# Patient Record
Sex: FEMALE | Race: WHITE | NOT HISPANIC OR LATINO | Employment: FULL TIME | URBAN - METROPOLITAN AREA
[De-identification: names, ages, dates, MRNs, and addresses within clinical notes are randomized per-mention and may not be internally consistent; named-entity substitution may affect disease eponyms.]

---

## 2020-09-10 DIAGNOSIS — E10.9 DIABETES MELLITUS TYPE I: Primary | ICD-10-CM

## 2020-09-10 DIAGNOSIS — E78.00 PURE HYPERCHOLESTEROLEMIA: ICD-10-CM

## 2020-09-10 DIAGNOSIS — Z87.891 PERSONAL HISTORY OF TOBACCO USE, PRESENTING HAZARDS TO HEALTH: ICD-10-CM

## 2020-09-11 DIAGNOSIS — Z78.0 ASYMPTOMATIC MENOPAUSAL STATE: Primary | ICD-10-CM

## 2020-09-11 DIAGNOSIS — Z13.820 SCREENING FOR OSTEOPOROSIS: ICD-10-CM

## 2020-09-17 ENCOUNTER — TELEPHONE (OUTPATIENT)
Dept: CARDIOLOGY | Facility: HOSPITAL | Age: 60
End: 2020-09-17

## 2020-09-17 RX ORDER — INSULIN LISPRO 100 [IU]/ML
INJECTION, SOLUTION INTRAVENOUS; SUBCUTANEOUS
COMMUNITY
Start: 2020-08-20 | End: 2024-04-02

## 2020-09-18 ENCOUNTER — CLINICAL SUPPORT (OUTPATIENT)
Dept: CARDIOLOGY | Facility: HOSPITAL | Age: 60
End: 2020-09-18
Attending: INTERNAL MEDICINE
Payer: COMMERCIAL

## 2020-09-18 VITALS — HEIGHT: 67 IN | BODY MASS INDEX: 24.92 KG/M2 | WEIGHT: 158.81 LBS

## 2020-09-18 DIAGNOSIS — Z87.891 PERSONAL HISTORY OF TOBACCO USE, PRESENTING HAZARDS TO HEALTH: ICD-10-CM

## 2020-09-18 DIAGNOSIS — E10.9 DIABETES MELLITUS TYPE I: ICD-10-CM

## 2020-09-18 DIAGNOSIS — E78.00 PURE HYPERCHOLESTEROLEMIA: ICD-10-CM

## 2020-09-18 LAB
CV STRESS BASE HR: 72 BPM
DIASTOLIC BLOOD PRESSURE: 76 MMHG
OHS CV CPX 1 MINUTE RECOVERY HEART RATE: 109 BPM
OHS CV CPX 85 PERCENT MAX PREDICTED HEART RATE MALE: 131
OHS CV CPX ESTIMATED METS: 10.3
OHS CV CPX MAX PREDICTED HEART RATE: 154
OHS CV CPX PATIENT IS FEMALE: 1
OHS CV CPX PATIENT IS MALE: 0
OHS CV CPX PEAK DIASTOLIC BLOOD PRESSURE: 71 MMHG
OHS CV CPX PEAK HEAR RATE: 170 BPM
OHS CV CPX PEAK RATE PRESSURE PRODUCT: NORMAL
OHS CV CPX PEAK SYSTOLIC BLOOD PRESSURE: 212 MMHG
OHS CV CPX PERCENT MAX PREDICTED HEART RATE ACHIEVED: 110
OHS CV CPX RATE PRESSURE PRODUCT PRESENTING: 7416
STRESS ECHO POST EXERCISE DUR MIN: 8 MINUTES
STRESS ECHO POST EXERCISE DUR SEC: 2 SECONDS
STRESS ST DEPRESSION: 2 MM
SYSTOLIC BLOOD PRESSURE: 103 MMHG

## 2020-09-18 PROCEDURE — 93018 EXERCISE STRESS - EKG (CUPID ONLY): ICD-10-PCS | Mod: ,,, | Performed by: INTERNAL MEDICINE

## 2020-09-18 PROCEDURE — 93017 CV STRESS TEST TRACING ONLY: CPT

## 2020-09-18 PROCEDURE — 93016 EXERCISE STRESS - EKG (CUPID ONLY): ICD-10-PCS | Mod: ,,, | Performed by: INTERNAL MEDICINE

## 2020-09-18 PROCEDURE — 93018 CV STRESS TEST I&R ONLY: CPT | Mod: ,,, | Performed by: INTERNAL MEDICINE

## 2020-09-18 PROCEDURE — 93016 CV STRESS TEST SUPVJ ONLY: CPT | Mod: ,,, | Performed by: INTERNAL MEDICINE

## 2020-09-28 ENCOUNTER — HOSPITAL ENCOUNTER (OUTPATIENT)
Dept: RADIOLOGY | Facility: HOSPITAL | Age: 60
Discharge: HOME | End: 2020-09-28
Attending: INTERNAL MEDICINE
Payer: COMMERCIAL

## 2020-09-28 DIAGNOSIS — Z78.0 ASYMPTOMATIC MENOPAUSAL STATE: ICD-10-CM

## 2020-09-28 DIAGNOSIS — Z13.820 SCREENING FOR OSTEOPOROSIS: ICD-10-CM

## 2020-09-28 PROCEDURE — 77080 DXA BONE DENSITY AXIAL: CPT | Mod: TC,PO

## 2020-11-17 ENCOUNTER — OFFICE VISIT (OUTPATIENT)
Dept: CARDIOLOGY | Facility: CLINIC | Age: 60
End: 2020-11-17
Payer: COMMERCIAL

## 2020-11-17 VITALS
HEART RATE: 78 BPM | WEIGHT: 159 LBS | SYSTOLIC BLOOD PRESSURE: 120 MMHG | RESPIRATION RATE: 16 BRPM | BODY MASS INDEX: 24.96 KG/M2 | OXYGEN SATURATION: 98 % | DIASTOLIC BLOOD PRESSURE: 78 MMHG | TEMPERATURE: 97 F | HEIGHT: 67 IN

## 2020-11-17 DIAGNOSIS — Z87.891 FORMER SMOKER: ICD-10-CM

## 2020-11-17 DIAGNOSIS — I47.10 SVT (SUPRAVENTRICULAR TACHYCARDIA): ICD-10-CM

## 2020-11-17 DIAGNOSIS — Z79.4 TYPE 2 DIABETES MELLITUS WITH HYPERGLYCEMIA, WITH LONG-TERM CURRENT USE OF INSULIN: ICD-10-CM

## 2020-11-17 DIAGNOSIS — E11.65 TYPE 2 DIABETES MELLITUS WITH HYPERGLYCEMIA, WITH LONG-TERM CURRENT USE OF INSULIN: ICD-10-CM

## 2020-11-17 DIAGNOSIS — R06.02 SOB (SHORTNESS OF BREATH): ICD-10-CM

## 2020-11-17 DIAGNOSIS — R94.39 ABNORMAL STRESS ECG: ICD-10-CM

## 2020-11-17 DIAGNOSIS — E78.5 DYSLIPIDEMIA: ICD-10-CM

## 2020-11-17 PROBLEM — E11.9 DM (DIABETES MELLITUS): Status: ACTIVE | Noted: 2020-11-17

## 2020-11-17 PROCEDURE — 93000 EKG 12-LEAD: ICD-10-PCS | Mod: S$GLB,,, | Performed by: INTERNAL MEDICINE

## 2020-11-17 PROCEDURE — 93000 ELECTROCARDIOGRAM COMPLETE: CPT | Mod: S$GLB,,, | Performed by: INTERNAL MEDICINE

## 2020-11-17 PROCEDURE — 3008F BODY MASS INDEX DOCD: CPT | Mod: CPTII,S$GLB,, | Performed by: INTERNAL MEDICINE

## 2020-11-17 PROCEDURE — 3008F PR BODY MASS INDEX (BMI) DOCUMENTED: ICD-10-PCS | Mod: CPTII,S$GLB,, | Performed by: INTERNAL MEDICINE

## 2020-11-17 PROCEDURE — 1126F AMNT PAIN NOTED NONE PRSNT: CPT | Mod: S$GLB,,, | Performed by: INTERNAL MEDICINE

## 2020-11-17 PROCEDURE — 99204 PR OFFICE/OUTPT VISIT, NEW, LEVL IV, 45-59 MIN: ICD-10-PCS | Mod: 25,S$GLB,, | Performed by: INTERNAL MEDICINE

## 2020-11-17 PROCEDURE — 99204 OFFICE O/P NEW MOD 45 MIN: CPT | Mod: 25,S$GLB,, | Performed by: INTERNAL MEDICINE

## 2020-11-17 PROCEDURE — 1126F PR PAIN SEVERITY QUANTIFIED, NO PAIN PRESENT: ICD-10-PCS | Mod: S$GLB,,, | Performed by: INTERNAL MEDICINE

## 2020-11-17 RX ORDER — ATORVASTATIN CALCIUM 10 MG/1
1 TABLET, FILM COATED ORAL
COMMUNITY
Start: 2020-01-06

## 2020-11-17 NOTE — ASSESSMENT & PLAN NOTE
This happened while on the treadmill.   She had no angina, just felt as if it was hard to breathe.  This happens only with exertion  Consider adding Mag Ox 400 mg daily

## 2020-11-17 NOTE — LETTER
November 21, 2020      Sally Shaw MD  [address] [facility]  [place] [address]           [facility] [place]  [address]  [place] [address]  Phone: [phone]  Fax: [phone]          Patient: Clarisa Quiroz   MR Number: [id number]   YOB: 1960   Date of Visit: 11/17/2020       Dear Dr. Sally Shaw:    Thank you for referring Clarisa Quiroz to me for evaluation. Attached you will find relevant portions of my assessment and plan of care.    If you have questions, please do not hesitate to call me. I look forward to following Clarisa Quiroz along with you.    Sincerely,    Santo Ellis MD    Enclosure  CC:  No Recipients    If you would like to receive this communication electronically, please contact [email or web address] or [phone] to request more information on [facility] [employer] access.    For providers and/or their staff who would like to refer a patient to [facility], please contact us through our one-stop-shop provider referral line, [facility], at [phone].    If you feel you have received this communication in error or would no longer like to receive these types of communications, please e-mail [email or web address]

## 2020-11-17 NOTE — PROGRESS NOTES
Subjective:    Patient ID:  Clarisa Quiroz is a 59 y.o. female patient here for evaluation Results (stress test done at [facility]), Shortness of Breath, COPD, and Diabetes      History of Present Illness:     59 year old female patient with a PMH significant for DM, COPD, and Dyslipidemia. She was a smoker for 30 years. Dr. Shaw ordered a stress test and it was nondiagnostic and patient is here to establish care. She denies chest, arm, neck or jaw pain. She does have some SOB but is an ex-smoker and has COPD. She denies cramps when she walks.         Review of patient's allergies indicates:  No Known Allergies    Past Medical History:   Diagnosis Date    COPD (chronic obstructive pulmonary disease)     DM (diabetes mellitus)     Dyslipidemia      History reviewed. No pertinent surgical history.  Social History     Tobacco Use    Smoking status: Former Smoker     Types: Cigarettes     Quit date: 2015     Years since quittin.2    Smokeless tobacco: Never Used   Substance Use Topics    Alcohol use: Not Currently     Comment: socially    Drug use: Never        Review of Systems:    As noted in HPI in addition      REVIEW OF SYSTEMS  CARDIOVASCULAR: No recent chest pain, palpitations, arm, neck, or jaw pain  RESPIRATORY: +SOB on exertion  : No blood in the urine  GI: No Nausea, vomiting, constipation, diarrhea, blood, or reflux.  MUSCULOSKELETAL: No myalgias  NEURO: No lightheadedness or dizziness  EYES: No Double vision, blurry, vision or headache              Objective:        Vitals:    20 1640   BP: 120/78   Pulse: 78   Resp: 16   Temp: 97 °F (36.1 °C)       LIPIDS - LAST 2   No results found for: CHOL, HDL, LDLCALC, TRIG, CHOLHDL    CBC - LAST 2  No results found for: WBC, RBC, HGB, HCT, MCV, MCH, MCHC, RDW, PLT, MPV, GRAN, LYMPH, MONO, BASO, NRBC    CHEMISTRY & LIVER FUNCTION - LAST 2  No results found for: NA, K, CL, CO2, ANIONGAP, BUN, CREATININE, GLU, CALCIUM, PH, MG, ALBUMIN, PROT, ALKPHOS,  ALT, AST, BILITOT     CARDIAC PROFILE - LAST 2  No results found for: BNP, CPK, CPKMB, LDH, TROPONINI     COAGULATION - LAST 2  No results found for: LABPT, INR, APTT    ENDOCRINE & PSA - LAST 2  No results found for: HGBA1C, MICROALBUR, TSH, PROCAL, PSA     ECHOCARDIOGRAM RESULTS  No results found for this or any previous visit.    CURRENT/PREVIOUS VISIT EKG  No results found for this or any previous visit.  No procedure found.   Results for orders placed in visit on 09/18/20   Exercise Stress - EKG    Narrative   The EKG portion of this study is abnormal (with Upsloping ST depression   without angina) but not diagnostic.    The patient reported no chest pain during the stress test.    The blood pressure response to stress was normal.    Arrhythmias during stress: occasional PVCs, SVT.    The exercise capacity was above average.       No procedure found.    PHYSICAL EXAM  CONSTITUTIONAL: Well built, well nourished in no apparent distress  NECK: no carotid bruit, no JVD  LUNGS: CTA  CHEST WALL: no tenderness  HEART: regular rate and rhythm, S1, S2 normal, no murmur, click, rub or gallop   ABDOMEN: soft, non-tender; bowel sounds normal; no masses,  no organomegaly  EXTREMITIES: Extremities normal, no edema, no calf tenderness noted  NEURO: AAO X 3    I HAVE REVIEWED :    The vital signs, nurses notes, and all the pertinent radiology and labs.         Current Outpatient Medications   Medication Instructions    atorvastatin (LIPITOR) 10 MG tablet 1 tablet    HUMALOG U-100 INSULIN 100 unit/mL injection No dose, route, or frequency recorded.          Assessment and Plan:             DM (diabetes mellitus)  Per Dr. Shaw    SVT (supraventricular tachycardia)  This happened while on the treadmill.   She had no angina, just felt as if it was hard to breathe.  This happens only with exertion  Consider adding Mag Ox 400 mg daily    Dyslipidemia  Patient is currently taking atorvastatin every other day secondary to  myalgias  Consider Mag ox 400 mg daily  Add CO Q 10 200 mg daily    Abnormal stress ECG  Non diagnostic  Consider doing Stress Myoview    Former smoker  Currently no longer smoking  Quit in 2015            Follow up in about 2 weeks (around 12/1/2020).

## 2020-12-23 ENCOUNTER — HOSPITAL ENCOUNTER (OUTPATIENT)
Dept: CARDIOLOGY | Facility: CLINIC | Age: 60
Discharge: HOME | End: 2020-12-23
Attending: NURSE PRACTITIONER
Payer: COMMERCIAL

## 2020-12-23 ENCOUNTER — HOSPITAL ENCOUNTER (OUTPATIENT)
Dept: RADIOLOGY | Facility: CLINIC | Age: 60
Discharge: HOME | End: 2020-12-23
Attending: NURSE PRACTITIONER
Payer: COMMERCIAL

## 2020-12-23 DIAGNOSIS — R94.39 ABNORMAL STRESS ECG: ICD-10-CM

## 2020-12-23 DIAGNOSIS — E78.5 DYSLIPIDEMIA: ICD-10-CM

## 2020-12-23 DIAGNOSIS — E11.65 TYPE 2 DIABETES MELLITUS WITH HYPERGLYCEMIA, WITH LONG-TERM CURRENT USE OF INSULIN: ICD-10-CM

## 2020-12-23 DIAGNOSIS — Z79.4 TYPE 2 DIABETES MELLITUS WITH HYPERGLYCEMIA, WITH LONG-TERM CURRENT USE OF INSULIN: ICD-10-CM

## 2020-12-23 PROCEDURE — 78452 HT MUSCLE IMAGE SPECT MULT: CPT | Mod: S$GLB,,, | Performed by: INTERNAL MEDICINE

## 2020-12-23 PROCEDURE — 78452 STRESS TEST WITH MYOCARDIAL PERFUSION (CUPID ONLY): ICD-10-PCS | Mod: S$GLB,,, | Performed by: INTERNAL MEDICINE

## 2020-12-23 PROCEDURE — A9502 TC99M TETROFOSMIN: HCPCS | Mod: S$GLB,,, | Performed by: INTERNAL MEDICINE

## 2020-12-23 PROCEDURE — 93015 STRESS TEST WITH MYOCARDIAL PERFUSION (CUPID ONLY): ICD-10-PCS | Mod: S$GLB,,, | Performed by: INTERNAL MEDICINE

## 2020-12-23 PROCEDURE — A9502 STRESS TEST WITH MYOCARDIAL PERFUSION (CUPID ONLY): ICD-10-PCS | Mod: S$GLB,,, | Performed by: INTERNAL MEDICINE

## 2020-12-23 PROCEDURE — 93015 CV STRESS TEST SUPVJ I&R: CPT | Mod: S$GLB,,, | Performed by: INTERNAL MEDICINE

## 2020-12-29 LAB
CV STRESS BASE HR: 63 BPM
DIASTOLIC BLOOD PRESSURE: 80 MMHG
EJECTION FRACTION- HIGH: 65 %
END DIASTOLIC INDEX-HIGH: 158 ML/M2
END DIASTOLIC INDEX-LOW: 94 ML/M2
END SYSTOLIC INDEX-HIGH: 71 ML/M2
END SYSTOLIC INDEX-LOW: 33 ML/M2
NUC STRESS DIASTOLIC VOLUME INDEX: 50
NUC STRESS EJECTION FRACTION: 73 %
NUC STRESS SYSTOLIC VOLUME INDEX: 14
OHS CV CPX 1 MINUTE RECOVERY HEART RATE: 133 BPM
OHS CV CPX 85 PERCENT MAX PREDICTED HEART RATE MALE: 130
OHS CV CPX ESTIMATED METS: 7
OHS CV CPX MAX PREDICTED HEART RATE: 153
OHS CV CPX PATIENT IS FEMALE: 1
OHS CV CPX PATIENT IS MALE: 0
OHS CV CPX PEAK DIASTOLIC BLOOD PRESSURE: 84 MMHG
OHS CV CPX PEAK HEAR RATE: 144 BPM
OHS CV CPX PEAK RATE PRESSURE PRODUCT: NORMAL
OHS CV CPX PEAK SYSTOLIC BLOOD PRESSURE: 142 MMHG
OHS CV CPX PERCENT MAX PREDICTED HEART RATE ACHIEVED: 94
OHS CV CPX RATE PRESSURE PRODUCT PRESENTING: 7686
RETIRED EF AND QEF - SEE NOTES: 53 %
STRESS ECHO POST EXERCISE DUR MIN: 6 MINUTES
STRESS ECHO POST EXERCISE DUR SEC: 0 SECONDS
SYSTOLIC BLOOD PRESSURE: 122 MMHG

## 2021-01-08 ENCOUNTER — OFFICE VISIT (OUTPATIENT)
Dept: CARDIOLOGY | Facility: CLINIC | Age: 61
End: 2021-01-08
Payer: COMMERCIAL

## 2021-01-08 VITALS
WEIGHT: 162 LBS | DIASTOLIC BLOOD PRESSURE: 80 MMHG | OXYGEN SATURATION: 98 % | SYSTOLIC BLOOD PRESSURE: 140 MMHG | HEART RATE: 72 BPM | RESPIRATION RATE: 16 BRPM | HEIGHT: 67 IN | BODY MASS INDEX: 25.43 KG/M2

## 2021-01-08 DIAGNOSIS — Z79.4 TYPE 2 DIABETES MELLITUS WITH HYPERGLYCEMIA, WITH LONG-TERM CURRENT USE OF INSULIN: ICD-10-CM

## 2021-01-08 DIAGNOSIS — E11.65 TYPE 2 DIABETES MELLITUS WITH HYPERGLYCEMIA, WITH LONG-TERM CURRENT USE OF INSULIN: ICD-10-CM

## 2021-01-08 DIAGNOSIS — R06.00 DYSPNEA, UNSPECIFIED TYPE: Primary | ICD-10-CM

## 2021-01-08 DIAGNOSIS — E78.5 DYSLIPIDEMIA: ICD-10-CM

## 2021-01-08 DIAGNOSIS — R94.39 ABNORMAL STRESS ECG: ICD-10-CM

## 2021-01-08 DIAGNOSIS — Z87.891 FORMER SMOKER: ICD-10-CM

## 2021-01-08 PROCEDURE — 1126F PR PAIN SEVERITY QUANTIFIED, NO PAIN PRESENT: ICD-10-PCS | Mod: S$GLB,,, | Performed by: NURSE PRACTITIONER

## 2021-01-08 PROCEDURE — 99214 OFFICE O/P EST MOD 30 MIN: CPT | Mod: S$GLB,,, | Performed by: NURSE PRACTITIONER

## 2021-01-08 PROCEDURE — 1126F AMNT PAIN NOTED NONE PRSNT: CPT | Mod: S$GLB,,, | Performed by: NURSE PRACTITIONER

## 2021-01-08 PROCEDURE — 99214 PR OFFICE/OUTPT VISIT, EST, LEVL IV, 30-39 MIN: ICD-10-PCS | Mod: S$GLB,,, | Performed by: NURSE PRACTITIONER

## 2021-01-08 PROCEDURE — 3008F PR BODY MASS INDEX (BMI) DOCUMENTED: ICD-10-PCS | Mod: CPTII,S$GLB,, | Performed by: NURSE PRACTITIONER

## 2021-01-08 PROCEDURE — 3008F BODY MASS INDEX DOCD: CPT | Mod: CPTII,S$GLB,, | Performed by: NURSE PRACTITIONER

## 2021-02-03 ENCOUNTER — HOSPITAL ENCOUNTER (OUTPATIENT)
Dept: PULMONOLOGY | Facility: HOSPITAL | Age: 61
Discharge: HOME | End: 2021-02-03
Attending: NURSE PRACTITIONER
Payer: COMMERCIAL

## 2021-02-03 DIAGNOSIS — R06.00 DYSPNEA, UNSPECIFIED TYPE: ICD-10-CM

## 2021-02-03 PROCEDURE — 94010 BREATHING CAPACITY TEST: CPT

## 2021-02-03 PROCEDURE — 94729 DIFFUSING CAPACITY: CPT

## 2021-02-03 PROCEDURE — 94727 GAS DIL/WSHOT DETER LNG VOL: CPT

## 2021-02-03 PROCEDURE — 94060 EVALUATION OF WHEEZING: CPT

## 2021-02-09 RX ORDER — BLOOD SUGAR DIAGNOSTIC
STRIP MISCELLANEOUS
COMMUNITY
Start: 2021-02-01

## 2021-02-09 RX ORDER — LANCETS
EACH MISCELLANEOUS
COMMUNITY
Start: 2021-02-01

## 2021-02-09 RX ORDER — BENZONATATE 100 MG/1
200 CAPSULE ORAL 3 TIMES DAILY PRN
COMMUNITY
Start: 2020-02-11 | End: 2021-02-10

## 2021-02-10 ENCOUNTER — OFFICE VISIT (OUTPATIENT)
Dept: PULMONOLOGY | Facility: CLINIC | Age: 61
End: 2021-02-10
Payer: COMMERCIAL

## 2021-02-10 VITALS
SYSTOLIC BLOOD PRESSURE: 130 MMHG | HEART RATE: 70 BPM | DIASTOLIC BLOOD PRESSURE: 75 MMHG | HEIGHT: 67 IN | BODY MASS INDEX: 25.9 KG/M2 | OXYGEN SATURATION: 98 % | WEIGHT: 165 LBS | TEMPERATURE: 97 F

## 2021-02-10 DIAGNOSIS — R06.00 DYSPNEA, UNSPECIFIED TYPE: ICD-10-CM

## 2021-02-10 DIAGNOSIS — J44.9 CHRONIC OBSTRUCTIVE PULMONARY DISEASE, UNSPECIFIED COPD TYPE: Primary | ICD-10-CM

## 2021-02-10 DIAGNOSIS — J43.1 PANLOBULAR EMPHYSEMA: ICD-10-CM

## 2021-02-10 DIAGNOSIS — Z12.2 ENCOUNTER FOR SCREENING FOR MALIGNANT NEOPLASM OF RESPIRATORY ORGANS: ICD-10-CM

## 2021-02-10 PROCEDURE — 99204 PR OFFICE/OUTPT VISIT, NEW, LEVL IV, 45-59 MIN: ICD-10-PCS | Mod: S$GLB,,, | Performed by: INTERNAL MEDICINE

## 2021-02-10 PROCEDURE — 3008F PR BODY MASS INDEX (BMI) DOCUMENTED: ICD-10-PCS | Mod: S$GLB,,, | Performed by: INTERNAL MEDICINE

## 2021-02-10 PROCEDURE — 99204 OFFICE O/P NEW MOD 45 MIN: CPT | Mod: S$GLB,,, | Performed by: INTERNAL MEDICINE

## 2021-02-10 PROCEDURE — 3008F BODY MASS INDEX DOCD: CPT | Mod: S$GLB,,, | Performed by: INTERNAL MEDICINE

## 2021-02-10 PROCEDURE — 1126F AMNT PAIN NOTED NONE PRSNT: CPT | Mod: S$GLB,,, | Performed by: INTERNAL MEDICINE

## 2021-02-10 PROCEDURE — 1126F PR PAIN SEVERITY QUANTIFIED, NO PAIN PRESENT: ICD-10-PCS | Mod: S$GLB,,, | Performed by: INTERNAL MEDICINE

## 2021-02-10 RX ORDER — UMECLIDINIUM BROMIDE AND VILANTEROL TRIFENATATE 62.5; 25 UG/1; UG/1
1 POWDER RESPIRATORY (INHALATION) DAILY
Qty: 1 EACH | Refills: 5 | Status: SHIPPED | OUTPATIENT
Start: 2021-02-10 | End: 2021-09-15

## 2021-02-10 RX ORDER — ALBUTEROL SULFATE 90 UG/1
2 AEROSOL, METERED RESPIRATORY (INHALATION) EVERY 6 HOURS PRN
Qty: 18 G | Refills: 0 | Status: SHIPPED | OUTPATIENT
Start: 2021-02-10 | End: 2022-02-10

## 2021-02-22 ENCOUNTER — TELEPHONE (OUTPATIENT)
Dept: PULMONOLOGY | Facility: CLINIC | Age: 61
End: 2021-02-22

## 2021-02-22 DIAGNOSIS — Z12.2 ENCOUNTER FOR SCREENING FOR MALIGNANT NEOPLASM OF RESPIRATORY ORGANS: ICD-10-CM

## 2021-02-22 DIAGNOSIS — Z87.891 PERSONAL HISTORY OF TOBACCO USE, PRESENTING HAZARDS TO HEALTH: Primary | ICD-10-CM

## 2021-03-04 ENCOUNTER — TELEPHONE (OUTPATIENT)
Dept: PULMONOLOGY | Facility: CLINIC | Age: 61
End: 2021-03-04

## 2021-03-04 ENCOUNTER — HOSPITAL ENCOUNTER (OUTPATIENT)
Dept: RADIOLOGY | Facility: HOSPITAL | Age: 61
Discharge: HOME | End: 2021-03-04
Attending: NURSE PRACTITIONER
Payer: COMMERCIAL

## 2021-03-04 ENCOUNTER — DOCUMENTATION ONLY (OUTPATIENT)
Dept: PULMONOLOGY | Facility: HOSPITAL | Age: 61
End: 2021-03-04

## 2021-03-04 DIAGNOSIS — Z87.891 PERSONAL HISTORY OF TOBACCO USE, PRESENTING HAZARDS TO HEALTH: ICD-10-CM

## 2021-03-04 PROCEDURE — 71271 CT THORAX LUNG CANCER SCR C-: CPT | Mod: TC,PO

## 2021-05-05 ENCOUNTER — OFFICE VISIT (OUTPATIENT)
Dept: PULMONOLOGY | Facility: CLINIC | Age: 61
End: 2021-05-05
Payer: COMMERCIAL

## 2021-05-05 VITALS
HEIGHT: 67 IN | SYSTOLIC BLOOD PRESSURE: 120 MMHG | HEART RATE: 89 BPM | OXYGEN SATURATION: 96 % | WEIGHT: 163 LBS | BODY MASS INDEX: 25.58 KG/M2 | DIASTOLIC BLOOD PRESSURE: 80 MMHG

## 2021-05-05 DIAGNOSIS — J44.9 CHRONIC OBSTRUCTIVE PULMONARY DISEASE, UNSPECIFIED COPD TYPE: ICD-10-CM

## 2021-05-05 DIAGNOSIS — J43.1 PANLOBULAR EMPHYSEMA: Primary | ICD-10-CM

## 2021-05-05 PROCEDURE — 99214 PR OFFICE/OUTPT VISIT, EST, LEVL IV, 30-39 MIN: ICD-10-PCS | Mod: S$GLB,,, | Performed by: INTERNAL MEDICINE

## 2021-05-05 PROCEDURE — 1126F PR PAIN SEVERITY QUANTIFIED, NO PAIN PRESENT: ICD-10-PCS | Mod: S$GLB,,, | Performed by: INTERNAL MEDICINE

## 2021-05-05 PROCEDURE — 99214 OFFICE O/P EST MOD 30 MIN: CPT | Mod: S$GLB,,, | Performed by: INTERNAL MEDICINE

## 2021-05-05 PROCEDURE — 1126F AMNT PAIN NOTED NONE PRSNT: CPT | Mod: S$GLB,,, | Performed by: INTERNAL MEDICINE

## 2021-05-05 PROCEDURE — 3008F PR BODY MASS INDEX (BMI) DOCUMENTED: ICD-10-PCS | Mod: S$GLB,,, | Performed by: INTERNAL MEDICINE

## 2021-05-05 PROCEDURE — 3008F BODY MASS INDEX DOCD: CPT | Mod: S$GLB,,, | Performed by: INTERNAL MEDICINE

## 2021-09-15 DIAGNOSIS — J43.1 PANLOBULAR EMPHYSEMA: ICD-10-CM

## 2021-09-15 RX ORDER — UMECLIDINIUM BROMIDE AND VILANTEROL TRIFENATATE 62.5; 25 UG/1; UG/1
1 POWDER RESPIRATORY (INHALATION) DAILY
Qty: 1 EACH | Refills: 5 | Status: SHIPPED | OUTPATIENT
Start: 2021-09-15 | End: 2022-04-13

## 2022-07-12 DIAGNOSIS — Z78.0 MENOPAUSE: ICD-10-CM

## 2022-07-12 DIAGNOSIS — Z13.820 SPECIAL SCREENING FOR OSTEOPOROSIS: Primary | ICD-10-CM

## 2022-10-03 ENCOUNTER — HOSPITAL ENCOUNTER (OUTPATIENT)
Dept: RADIOLOGY | Facility: HOSPITAL | Age: 62
Discharge: HOME | End: 2022-10-03
Attending: INTERNAL MEDICINE
Payer: COMMERCIAL

## 2022-10-03 DIAGNOSIS — Z78.0 MENOPAUSE: ICD-10-CM

## 2022-10-03 DIAGNOSIS — Z13.820 SPECIAL SCREENING FOR OSTEOPOROSIS: ICD-10-CM

## 2022-10-03 PROCEDURE — 77080 DXA BONE DENSITY AXIAL: CPT | Mod: TC,PO

## 2023-08-23 DIAGNOSIS — Z12.31 ENCOUNTER FOR SCREENING MAMMOGRAM FOR MALIGNANT NEOPLASM OF BREAST: Primary | ICD-10-CM

## 2023-08-30 NOTE — ASSESSMENT & PLAN NOTE
Patient is currently taking atorvastatin every other day secondary to myalgias  Consider Mag ox 400 mg daily  Add CO Q 10 200 mg daily   PERRL/conjunctiva clear

## 2023-09-01 ENCOUNTER — HOSPITAL ENCOUNTER (OUTPATIENT)
Dept: RADIOLOGY | Facility: HOSPITAL | Age: 63
Discharge: HOME | End: 2023-09-01
Attending: SPECIALIST
Payer: COMMERCIAL

## 2023-09-01 DIAGNOSIS — Z12.31 ENCOUNTER FOR SCREENING MAMMOGRAM FOR MALIGNANT NEOPLASM OF BREAST: ICD-10-CM

## 2023-09-01 PROCEDURE — 77067 SCR MAMMO BI INCL CAD: CPT | Mod: TC,PO

## 2024-01-08 DIAGNOSIS — J43.1 PANLOBULAR EMPHYSEMA: ICD-10-CM

## 2024-01-08 RX ORDER — UMECLIDINIUM BROMIDE AND VILANTEROL TRIFENATATE 62.5; 25 UG/1; UG/1
1 POWDER RESPIRATORY (INHALATION)
Qty: 60 EACH | Refills: 3 | Status: SHIPPED | OUTPATIENT
Start: 2024-01-08

## 2024-02-20 ENCOUNTER — OFFICE VISIT (OUTPATIENT)
Dept: PULMONOLOGY | Facility: CLINIC | Age: 64
End: 2024-02-20
Payer: COMMERCIAL

## 2024-02-20 VITALS
HEART RATE: 76 BPM | TEMPERATURE: 98 F | WEIGHT: 150.38 LBS | DIASTOLIC BLOOD PRESSURE: 78 MMHG | SYSTOLIC BLOOD PRESSURE: 120 MMHG | OXYGEN SATURATION: 96 % | BODY MASS INDEX: 23.56 KG/M2

## 2024-02-20 DIAGNOSIS — R91.1 PULMONARY NODULE: Primary | ICD-10-CM

## 2024-02-20 DIAGNOSIS — J18.9 PNEUMONIA DUE TO INFECTIOUS ORGANISM, UNSPECIFIED LATERALITY, UNSPECIFIED PART OF LUNG: ICD-10-CM

## 2024-02-20 DIAGNOSIS — J44.9 CHRONIC OBSTRUCTIVE PULMONARY DISEASE, UNSPECIFIED COPD TYPE: ICD-10-CM

## 2024-02-20 PROCEDURE — 99214 OFFICE O/P EST MOD 30 MIN: CPT | Mod: S$GLB,,, | Performed by: NURSE PRACTITIONER

## 2024-02-20 PROCEDURE — 3078F DIAST BP <80 MM HG: CPT | Mod: CPTII,S$GLB,, | Performed by: NURSE PRACTITIONER

## 2024-02-20 PROCEDURE — 3074F SYST BP LT 130 MM HG: CPT | Mod: CPTII,S$GLB,, | Performed by: NURSE PRACTITIONER

## 2024-02-20 PROCEDURE — 3008F BODY MASS INDEX DOCD: CPT | Mod: CPTII,S$GLB,, | Performed by: NURSE PRACTITIONER

## 2024-02-20 PROCEDURE — 1159F MED LIST DOCD IN RCRD: CPT | Mod: CPTII,S$GLB,, | Performed by: NURSE PRACTITIONER

## 2024-02-20 RX ORDER — ALBUTEROL SULFATE 90 UG/1
2 AEROSOL, METERED RESPIRATORY (INHALATION) EVERY 6 HOURS PRN
Qty: 18 G | Refills: 6 | Status: SHIPPED | OUTPATIENT
Start: 2024-02-20 | End: 2025-02-19

## 2024-02-20 RX ORDER — ALBUTEROL SULFATE 1.25 MG/3ML
1.25 SOLUTION RESPIRATORY (INHALATION) EVERY 6 HOURS PRN
Qty: 75 ML | Refills: 0 | Status: SHIPPED | OUTPATIENT
Start: 2024-02-20 | End: 2025-02-19

## 2024-02-20 NOTE — PROGRESS NOTES
SUBJECTIVE:    Patient ID: Clarisa Quiroz is a 63 y.o. female.    Chief Complaint: Follow-up (Follow up/Last seen 05/05/2021/Went to Urgent Care last Friday was told her she had pneumonia)    HPI  Patient here today for the first time since 2021.  She states she had cancelled appointments because she felt well. She did not have follow up CT scan.   She states she went to Urgent care last week and was told she had pneumonia. She  got a steroid shot and antibiotic shot. She was also given a zpack and steroids oral for home.  She states they did a chest xray but she does not have a copy.  She has been using Anoro. She has had to use her rescue inhaler as needed. She denies fever or night sweats, has lost 8 pounds since being ill.    Past Medical History:   Diagnosis Date    COPD (chronic obstructive pulmonary disease)     DM (diabetes mellitus)     Dyslipidemia      No past surgical history on file.  Family History   Problem Relation Age of Onset    Aortic aneurysm Mother     Cancer Father         Social History:   Marital Status:   Occupation: Data Unavailable  Alcohol History:  reports that she does not currently use alcohol.  Tobacco History:  reports that she quit smoking about 8 years ago. Her smoking use included cigarettes. She started smoking about 44 years ago. She has a 72.0 pack-year smoking history. She has never used smokeless tobacco.  Drug History:  reports no history of drug use.    Review of patient's allergies indicates:  No Known Allergies    Current Outpatient Medications   Medication Sig Dispense Refill    ANORO ELLIPTA 62.5-25 mcg/actuation DsDv INHALE 1 PUFF INTO THE LUNGS EVERY DAY 60 each 3    atorvastatin (LIPITOR) 10 MG tablet 1 tablet.      HUMALOG U-100 INSULIN 100 unit/mL injection       ACCU-CHEK FASTCLIX LANCET DRUM [facility]       ACCU-CHEK GUIDE TEST STRIPS Strp       albuterol (ACCUNEB) 1.25 mg/3 mL Nebu Take 3 mLs (1.25 mg total) by nebulization every 6 (six) hours as needed.  Rescue 75 mL 0     No current facility-administered medications for this visit.       Alpha-1 Antitrypsin:  Last PFT:   Last CT:    Review of Systems  General: no night sweats no fever   Eyes: Vision is good.  ENT:  No sinusitis or pharyngitis.   Heart:: No chest pain or palpitations.  Lungs:increased dyspnea for two weeks, pleurisy   GI: No Nausea, vomiting, constipation, diarrhea, or reflux.  : No dysuria, hesitancy, or nocturia.  Musculoskeletal: No joint pain or myalgias.  Skin: No lesions or rashes.  Neuro: No headaches or neuropathy.  Lymph: No edema or adenopathy.  Psych: No anxiety or depression.  Endo: weight loss since being acutely ill .    OBJECTIVE:      /78 (BP Location: Right arm, Patient Position: Sitting, BP Method: Medium (Manual))   Pulse 76   Temp 97.9 °F (36.6 °C)   Wt 68.2 kg (150 lb 6.4 oz)   SpO2 96%   BMI 23.56 kg/m²     Physical Exam  GENERAL: Older patient in no distress.  HEENT: Pupils equal and reactive. Extraocular movements intact. Nose intact.      Pharynx moist.  NECK: Supple.   HEART: Regular rate and rhythm. No murmur or gallop auscultated.  LUNGS: Clear to auscultation and percussion. Lung excursion symmetrical. No change in fremitus. No adventitial noises.  ABDOMEN: Bowel sounds present. Non-tender, no masses palpated.  EXTREMITIES: Normal muscle tone and joint movement, no cyanosis or clubbing.   LYMPHATICS: No adenopathy palpated, no edema.  SKIN: Dry, intact, no lesions.   NEURO: Cranial nerves II-XII intact. Motor strength 5/5 bilaterally, upper and lower extremities.  PSYCH: Appropriate affect.    Assessment:       1. Pulmonary nodule    2. Chronic obstructive pulmonary disease, unspecified COPD type    3. Pneumonia due to infectious organism, unspecified laterality, unspecified part of lung          Plan:       Pulmonary nodule  -     CT Chest Without Contrast; Future    Chronic obstructive pulmonary disease, unspecified COPD type  -     Complete PFT with  bronchodilator; Future  -     NEBULIZER FOR HOME USE    Pneumonia due to infectious organism, unspecified laterality, unspecified part of lung    Other orders  -     albuterol (ACCUNEB) 1.25 mg/3 mL Nebu; Take 3 mLs (1.25 mg total) by nebulization every 6 (six) hours as needed. Rescue  Dispense: 75 mL; Refill: 0         Follow up in about 4 weeks (around 3/19/2024).      PFT  Ct chest   Albuterol for nebulizer  Needs a nebulizer   Ventolin is as needed 2 puffs every 4-6 hours for cough and dyspnea   Continue the Anoro daily

## 2024-03-06 ENCOUNTER — TELEPHONE (OUTPATIENT)
Dept: PULMONOLOGY | Facility: CLINIC | Age: 64
End: 2024-03-06

## 2024-03-06 ENCOUNTER — HOSPITAL ENCOUNTER (OUTPATIENT)
Dept: RADIOLOGY | Facility: HOSPITAL | Age: 64
Discharge: HOME | End: 2024-03-06
Attending: NURSE PRACTITIONER
Payer: COMMERCIAL

## 2024-03-06 DIAGNOSIS — R91.1 PULMONARY NODULE: ICD-10-CM

## 2024-03-06 DIAGNOSIS — R13.10 DYSPHAGIA, UNSPECIFIED TYPE: Primary | ICD-10-CM

## 2024-03-06 PROCEDURE — 71250 CT THORAX DX C-: CPT | Mod: TC,PO

## 2024-03-06 RX ORDER — PANTOPRAZOLE SODIUM 40 MG/1
40 TABLET, DELAYED RELEASE ORAL DAILY
Qty: 30 TABLET | Refills: 3 | Status: SHIPPED | OUTPATIENT
Start: 2024-03-06

## 2024-03-06 NOTE — TELEPHONE ENCOUNTER
Spoke to the patient regarding Ct findings. Will repeat ct in 6 months. She will call Dr. Williamson to set up an appointment with him to have EGD, I also put in a referral to evaluate esophagus.  I sent protonix as well. She is still coughing, changed from anoro to Trelegy. She is having significant reflux day and night, she can not drink certain things due to dysphagia.  I told her to not eat or drink anything at least 2 hours before bed, elevate head of bed. She understands she needs to see GI MD sooner then later considering CT and her symptoms. She verbalized understaing.

## 2024-03-06 NOTE — TELEPHONE ENCOUNTER
Ct chest  MPRESSION:  1. Moderate to severe bilateral emphysematous changes.  2. Stable 7 mm right-sided noncalcified pulmonary nodule. Reticulonodular opacities in the right upper lobe are also stable.  3. Small consolidative opacity medially in the right middle lobe could reflect atelectasis or mild infiltrate formation.  4. Focal circumferential wall thickening of the distal esophagus may be at least in part related to incomplete distention. Inflammatory involvement or mass is not excluded. Follow-up upper GI or endoscopy can be utilized for further assessment.  5. Additional findings as above.

## 2024-03-26 ENCOUNTER — HOSPITAL ENCOUNTER (OUTPATIENT)
Dept: PULMONOLOGY | Facility: HOSPITAL | Age: 64
Discharge: HOME | End: 2024-03-26
Attending: NURSE PRACTITIONER
Payer: COMMERCIAL

## 2024-03-26 DIAGNOSIS — J44.9 CHRONIC OBSTRUCTIVE PULMONARY DISEASE, UNSPECIFIED COPD TYPE: ICD-10-CM

## 2024-03-26 PROCEDURE — 94729 DIFFUSING CAPACITY: CPT

## 2024-03-26 PROCEDURE — 94010 BREATHING CAPACITY TEST: CPT

## 2024-03-26 PROCEDURE — 94727 GAS DIL/WSHOT DETER LNG VOL: CPT

## 2024-03-26 PROCEDURE — 94060 EVALUATION OF WHEEZING: CPT

## 2024-04-02 ENCOUNTER — OFFICE VISIT (OUTPATIENT)
Dept: PULMONOLOGY | Facility: CLINIC | Age: 64
End: 2024-04-02
Payer: COMMERCIAL

## 2024-04-02 VITALS
HEART RATE: 73 BPM | OXYGEN SATURATION: 95 % | SYSTOLIC BLOOD PRESSURE: 125 MMHG | BODY MASS INDEX: 22.76 KG/M2 | WEIGHT: 145 LBS | HEIGHT: 67 IN | DIASTOLIC BLOOD PRESSURE: 80 MMHG

## 2024-04-02 DIAGNOSIS — J44.9 CHRONIC OBSTRUCTIVE PULMONARY DISEASE, UNSPECIFIED COPD TYPE: Primary | ICD-10-CM

## 2024-04-02 DIAGNOSIS — R94.2 DECREASED DIFFUSION CAPACITY: ICD-10-CM

## 2024-04-02 PROCEDURE — 3074F SYST BP LT 130 MM HG: CPT | Mod: CPTII,S$GLB,, | Performed by: NURSE PRACTITIONER

## 2024-04-02 PROCEDURE — 3079F DIAST BP 80-89 MM HG: CPT | Mod: CPTII,S$GLB,, | Performed by: NURSE PRACTITIONER

## 2024-04-02 PROCEDURE — 99214 OFFICE O/P EST MOD 30 MIN: CPT | Mod: S$GLB,,, | Performed by: NURSE PRACTITIONER

## 2024-04-02 PROCEDURE — 3008F BODY MASS INDEX DOCD: CPT | Mod: CPTII,S$GLB,, | Performed by: NURSE PRACTITIONER

## 2024-04-02 PROCEDURE — 1159F MED LIST DOCD IN RCRD: CPT | Mod: CPTII,S$GLB,, | Performed by: NURSE PRACTITIONER

## 2024-04-02 RX ORDER — INSULIN LISPRO-AABC 100 [IU]/ML
INJECTION, SOLUTION INTRAVENOUS; SUBCUTANEOUS
COMMUNITY
Start: 2024-02-02

## 2024-04-02 RX ORDER — CYCLOSPORINE 0.5 MG/ML
1 EMULSION OPHTHALMIC 2 TIMES DAILY
COMMUNITY
Start: 2024-03-28

## 2024-04-02 RX ORDER — METHYLPREDNISOLONE 4 MG/1
TABLET ORAL
COMMUNITY
Start: 2024-02-15

## 2024-04-02 NOTE — PROGRESS NOTES
SUBJECTIVE:    Patient ID: Clarisa Quiroz is a 63 y.o. female.    Chief Complaint: Follow-up, Pulmonary Nodules, and COPD    HPI    PFT shows moderate obstruction with no change to bronchodilator, no restriction, severe diffusion defect. Her diffusion is worse compared to previous.  She is using Trelegy daily.  She states she went to Dr. Williamson but she can not afford deductible for EGD at present time.  The CT scan of her chest shows moderate to severe emphysema,Small consolidative opacity medially in the right middle lobe could reflect atelectasis or mild infiltrate formation. There is Focal circumferential wall thickening of the distal esophagus may be at least in part related to incomplete distention.      Past Medical History:   Diagnosis Date    COPD (chronic obstructive pulmonary disease)     DM (diabetes mellitus)     Dyslipidemia      History reviewed. No pertinent surgical history.  Family History   Problem Relation Age of Onset    Aortic aneurysm Mother     Cancer Father         Social History:   Marital Status:   Occupation: Data Unavailable  Alcohol History:  reports that she does not currently use alcohol.  Tobacco History:  reports that she quit smoking about 8 years ago. Her smoking use included cigarettes. She started smoking about 44 years ago. She has a 72.0 pack-year smoking history. She has never used smokeless tobacco.  Drug History:  reports no history of drug use.    Review of patient's allergies indicates:  No Known Allergies    Current Outpatient Medications   Medication Sig Dispense Refill    albuterol (ACCUNEB) 1.25 mg/3 mL Nebu Take 3 mLs (1.25 mg total) by nebulization every 6 (six) hours as needed. Rescue 75 mL 0    albuterol (VENTOLIN HFA) 90 mcg/actuation inhaler Inhale 2 puffs into the lungs every 6 (six) hours as needed for Wheezing. Rescue 18 g 6    atorvastatin (LIPITOR) 10 MG tablet 1 tablet.      cycloSPORINE (RESTASIS) 0.05 % ophthalmic emulsion Place 1 drop into both  "eyes 2 (two) times daily.      fluticasone-umeclidin-vilanter (TRELEGY ELLIPTA) 100-62.5-25 mcg DsDv Inhale 1 puff into the lungs once daily. 1 each 6    LYUMJEV U-100 INSULIN 100 unit/mL Inject into the skin.      methylPREDNISolone (MEDROL DOSEPACK) 4 mg tablet FOLLOW PACKAGE DIRECTIONS      ACCU-CHEK FASTCLIX LANCET DRUM [facility]       ACCU-CHEK GUIDE TEST STRIPS Strp       pantoprazole (PROTONIX) 40 MG tablet Take 1 tablet (40 mg total) by mouth once daily. (Patient not taking: Reported on 4/2/2024) 30 tablet 3     No current facility-administered medications for this visit.         Last PFT: moderate obstruction, no restriction, severe diffusion defect.   Last CT:03/2024  MPRESSION:  1. Moderate to severe bilateral emphysematous changes.  2. Stable 7 mm right-sided noncalcified pulmonary nodule. Reticulonodular opacities in the right upper lobe are also stable.  3. Small consolidative opacity medially in the right middle lobe could reflect atelectasis or mild infiltrate formation.  4. Focal circumferential wall thickening of the distal esophagus may be at least in part related to incomplete distention. Inflammatory involvement or mass is not excluded. Follow-up upper GI or endoscopy can be utilized for further assessment.  5. Additional findings as above.  Review of Systems  General: no night sweats no fever   Eyes: Vision is good.  ENT:  No sinusitis or pharyngitis.   Heart:: No chest pain or palpitations.  Lungs:increased dyspnea for two weeks, pleurisy   GI: No Nausea, vomiting, constipation, diarrhea, or reflux.  : No dysuria, hesitancy, or nocturia.  Musculoskeletal: No joint pain or myalgias.  Skin: No lesions or rashes.  Neuro: No headaches or neuropathy.  Lymph: No edema or adenopathy.  Psych: No anxiety or depression.  Endo: weight loss since being acutely ill .    OBJECTIVE:      /80 (BP Location: Left arm, Patient Position: Sitting, BP Method: Medium (Manual))   Pulse 73   Ht 5' 7" (1.702 m)   " Wt 65.8 kg (145 lb)   SpO2 95%   BMI 22.71 kg/m²     Physical Exam  GENERAL: Older patient in no distress.  HEENT: Pupils equal and reactive. Extraocular movements intact. Nose intact.      Pharynx moist.  NECK: Supple.   HEART: Regular rate and rhythm. No murmur or gallop auscultated.  LUNGS: Clear to auscultation and percussion. Lung excursion symmetrical. No change in fremitus. No adventitial noises.  ABDOMEN: Bowel sounds present. Non-tender, no masses palpated.  EXTREMITIES: Normal muscle tone and joint movement, no cyanosis or clubbing.   LYMPHATICS: No adenopathy palpated, no edema.  SKIN: Dry, intact, no lesions.   NEURO: Cranial nerves II-XII intact. Motor strength 5/5 bilaterally, upper and lower extremities.  PSYCH: Appropriate affect.    Assessment:       1. Chronic obstructive pulmonary disease, unspecified COPD type    2. Decreased diffusion capacity            Plan:       Chronic obstructive pulmonary disease, unspecified COPD type    Decreased diffusion capacity  -     CBC auto differential; Future; Expected date: 04/02/2024  -     Echo; Future  -     Iron and TIBC; Future; Expected date: 04/02/2024  -     Ferritin; Future; Expected date: 04/02/2024           Follow up in about 6 months (around 10/2/2024).      Continue the Trelegy daily   Get last note from dr. Williamson  Cbc, iron studies and ECHO to evaluate for cause of severely decreased diffusion capacity  Have your EGD

## 2024-04-07 LAB
BASOPHILS # BLD AUTO: 47 CELLS/UL (ref 0–200)
BASOPHILS NFR BLD AUTO: 0.8 %
EOSINOPHIL # BLD AUTO: 130 CELLS/UL (ref 15–500)
EOSINOPHIL NFR BLD AUTO: 2.2 %
ERYTHROCYTE [DISTWIDTH] IN BLOOD BY AUTOMATED COUNT: 13.1 % (ref 11–15)
HCT VFR BLD AUTO: 44.7 % (ref 35–45)
HGB BLD-MCNC: 14.3 G/DL (ref 11.7–15.5)
IRON SATN MFR SERPL: 23 % (CALC) (ref 16–45)
IRON SERPL-MCNC: 70 MCG/DL (ref 45–160)
LYMPHOCYTES # BLD AUTO: 1894 CELLS/UL (ref 850–3900)
LYMPHOCYTES NFR BLD AUTO: 32.1 %
MCH RBC QN AUTO: 30.7 PG (ref 27–33)
MCHC RBC AUTO-ENTMCNC: 32 G/DL (ref 32–36)
MCV RBC AUTO: 95.9 FL (ref 80–100)
MONOCYTES # BLD AUTO: 561 CELLS/UL (ref 200–950)
MONOCYTES NFR BLD AUTO: 9.5 %
NEUTROPHILS # BLD AUTO: 3269 CELLS/UL (ref 1500–7800)
NEUTROPHILS NFR BLD AUTO: 55.4 %
PLATELET # BLD AUTO: 236 THOUSAND/UL (ref 140–400)
PMV BLD REES-ECKER: 10.9 FL (ref 7.5–12.5)
RBC # BLD AUTO: 4.66 MILLION/UL (ref 3.8–5.1)
TIBC SERPL-MCNC: 307 MCG/DL (CALC) (ref 250–450)
WBC # BLD AUTO: 5.9 THOUSAND/UL (ref 3.8–10.8)

## 2024-04-08 ENCOUNTER — TELEPHONE (OUTPATIENT)
Dept: PULMONOLOGY | Facility: CLINIC | Age: 64
End: 2024-04-08

## 2024-09-10 ENCOUNTER — TELEPHONE (OUTPATIENT)
Dept: PULMONOLOGY | Facility: CLINIC | Age: 64
End: 2024-09-10
Payer: COMMERCIAL

## 2024-09-10 DIAGNOSIS — R91.1 PULMONARY NODULE: Primary | ICD-10-CM

## 2024-09-13 NOTE — TELEPHONE ENCOUNTER
Called patient told her this CT Chest  would be be a follow up from your last one.  She said she will schedule it in October.

## 2024-09-13 NOTE — TELEPHONE ENCOUNTER
Called patient told her she is due for CT Chest -- She said she had one in March 2024 does she still need another one.  She had her upper GI July 2024.  Please advise.

## 2024-10-02 ENCOUNTER — TELEPHONE (OUTPATIENT)
Dept: PULMONOLOGY | Facility: CLINIC | Age: 64
End: 2024-10-02
Payer: COMMERCIAL

## 2024-10-02 NOTE — TELEPHONE ENCOUNTER
----- Message from Med Assistant Elizalde sent at 10/2/2024 11:24 AM CDT -----  Regarding: tomorrows apt needs to be kristopher  Pt has apt with shar tomorrow but has not had her ct or echo done yet.  I spoke with pt and she knows not to come in tomorrow but needs to reschedule. please

## 2024-11-01 ENCOUNTER — HOSPITAL ENCOUNTER (OUTPATIENT)
Dept: RADIOLOGY | Facility: HOSPITAL | Age: 64
Discharge: HOME | End: 2024-11-01
Attending: NURSE PRACTITIONER
Payer: COMMERCIAL

## 2024-11-01 DIAGNOSIS — M81.0 OSTEOPOROSIS: Primary | ICD-10-CM

## 2024-11-01 DIAGNOSIS — R91.1 PULMONARY NODULE: ICD-10-CM

## 2024-11-01 PROCEDURE — 71250 CT THORAX DX C-: CPT | Mod: 26,,, | Performed by: RADIOLOGY

## 2024-11-01 PROCEDURE — 71250 CT THORAX DX C-: CPT | Mod: TC,PO

## 2024-11-04 ENCOUNTER — TELEPHONE (OUTPATIENT)
Dept: PULMONOLOGY | Facility: CLINIC | Age: 64
End: 2024-11-04
Payer: COMMERCIAL

## 2024-11-04 NOTE — TELEPHONE ENCOUNTER
Ct chest   Impression:     1.  Pulmonary nodule measuring up to 7 mm in the right lower lobe without adverse interval change from the previous exam.     2.  Additional punctate pulmonary nodules as outlined above, similar to the previous exam.     3.  Mild circumferential wall thickening at the GE junction, improved from the previous CT.     4.  Severe, upper lobe predominant emphysematous lung disease.

## 2024-11-13 ENCOUNTER — HOSPITAL ENCOUNTER (OUTPATIENT)
Dept: RADIOLOGY | Facility: HOSPITAL | Age: 64
Discharge: HOME | End: 2024-11-13
Attending: NURSE PRACTITIONER
Payer: COMMERCIAL

## 2024-11-13 DIAGNOSIS — M81.0 OSTEOPOROSIS: ICD-10-CM

## 2024-11-13 PROCEDURE — 77080 DXA BONE DENSITY AXIAL: CPT | Mod: 26,,, | Performed by: RADIOLOGY

## 2024-11-13 PROCEDURE — 77080 DXA BONE DENSITY AXIAL: CPT | Mod: TC,PO

## 2024-12-17 ENCOUNTER — OFFICE VISIT (OUTPATIENT)
Dept: PULMONOLOGY | Facility: CLINIC | Age: 64
End: 2024-12-17
Payer: COMMERCIAL

## 2024-12-17 VITALS
DIASTOLIC BLOOD PRESSURE: 62 MMHG | HEART RATE: 72 BPM | HEIGHT: 67 IN | SYSTOLIC BLOOD PRESSURE: 120 MMHG | OXYGEN SATURATION: 95 % | TEMPERATURE: 98 F | BODY MASS INDEX: 24.43 KG/M2 | WEIGHT: 155.63 LBS

## 2024-12-17 DIAGNOSIS — J44.9 CHRONIC OBSTRUCTIVE PULMONARY DISEASE, UNSPECIFIED COPD TYPE: Primary | ICD-10-CM

## 2024-12-17 DIAGNOSIS — J43.1 PANLOBULAR EMPHYSEMA: ICD-10-CM

## 2024-12-17 DIAGNOSIS — R94.2 DECREASED DIFFUSION CAPACITY: ICD-10-CM

## 2024-12-17 PROCEDURE — 3008F BODY MASS INDEX DOCD: CPT | Mod: CPTII,S$GLB,, | Performed by: NURSE PRACTITIONER

## 2024-12-17 PROCEDURE — 1159F MED LIST DOCD IN RCRD: CPT | Mod: CPTII,S$GLB,, | Performed by: NURSE PRACTITIONER

## 2024-12-17 PROCEDURE — 99213 OFFICE O/P EST LOW 20 MIN: CPT | Mod: S$GLB,,, | Performed by: NURSE PRACTITIONER

## 2024-12-17 PROCEDURE — 3074F SYST BP LT 130 MM HG: CPT | Mod: CPTII,S$GLB,, | Performed by: NURSE PRACTITIONER

## 2024-12-17 PROCEDURE — 99999 PR PBB SHADOW E&M-EST. PATIENT-LVL III: CPT | Mod: PBBFAC,,, | Performed by: NURSE PRACTITIONER

## 2024-12-17 PROCEDURE — 3078F DIAST BP <80 MM HG: CPT | Mod: CPTII,S$GLB,, | Performed by: NURSE PRACTITIONER

## 2024-12-17 RX ORDER — UMECLIDINIUM BROMIDE AND VILANTEROL TRIFENATATE 62.5; 25 UG/1; UG/1
1 POWDER RESPIRATORY (INHALATION) DAILY
Qty: 1 EACH | Refills: 5 | Status: SHIPPED | OUTPATIENT
Start: 2024-12-17

## 2024-12-17 NOTE — PROGRESS NOTES
SUBJECTIVE:    Patient ID: Clarisa Quiroz is a 64 y.o. female.    Chief Complaint: Follow-up (6 month follow up COPD)      Patient here today feeling well. She did have an EGD since her last visit, states she had her esophagus stretched. She was told she had yeast in her esophagus on the scope from her Trelegy. She feels her breathing is stable.  Her CT showed severe upper lobe predominant emphysema and small nodules.      Past Medical History:   Diagnosis Date    COPD (chronic obstructive pulmonary disease)     DM (diabetes mellitus)     Dyslipidemia      No past surgical history on file.  Family History   Problem Relation Name Age of Onset    Aortic aneurysm Mother      Cancer Father          Social History:   Marital Status:   Occupation: Data Unavailable  Alcohol History:  reports that she does not currently use alcohol.  Tobacco History:  reports that she quit smoking about 9 years ago. Her smoking use included cigarettes. She started smoking about 45 years ago. She has a 72 pack-year smoking history. She has never used smokeless tobacco.  Drug History:  reports no history of drug use.    Review of patient's allergies indicates:  No Known Allergies    Current Outpatient Medications   Medication Sig Dispense Refill    albuterol (ACCUNEB) 1.25 mg/3 mL Nebu Take 3 mLs (1.25 mg total) by nebulization every 6 (six) hours as needed. Rescue 75 mL 0    albuterol (VENTOLIN HFA) 90 mcg/actuation inhaler Inhale 2 puffs into the lungs every 6 (six) hours as needed for Wheezing. Rescue 18 g 6    atorvastatin (LIPITOR) 10 MG tablet 1 tablet.      LYUMJEV U-100 INSULIN 100 unit/mL Inject into the skin.      ACCU-CHEK FASTCLIX LANCET DRUM [facility]       ACCU-CHEK GUIDE TEST STRIPS Strp       cycloSPORINE (RESTASIS) 0.05 % ophthalmic emulsion Place 1 drop into both eyes 2 (two) times daily.      methylPREDNISolone (MEDROL DOSEPACK) 4 mg tablet FOLLOW PACKAGE DIRECTIONS      pantoprazole (PROTONIX) 40 MG tablet Take 1  "tablet (40 mg total) by mouth once daily. 30 tablet 3    umeclidinium-vilanteroL (ANORO ELLIPTA) 62.5-25 mcg/actuation DsDv Inhale 1 puff into the lungs once daily. Controller 1 each 5     No current facility-administered medications for this visit.         Last PFT: moderate obstruction, no restriction, severe diffusion defect.   Last CT:03/2024  MPRESSION:  1. Moderate to severe bilateral emphysematous changes.  2. Stable 7 mm right-sided noncalcified pulmonary nodule. Reticulonodular opacities in the right upper lobe are also stable.  3. Small consolidative opacity medially in the right middle lobe could reflect atelectasis or mild infiltrate formation.  4. Focal circumferential wall thickening of the distal esophagus may be at least in part related to incomplete distention. Inflammatory involvement or mass is not excluded. Follow-up upper GI or endoscopy can be utilized for further assessment.  5. Additional findings as above.        Review of Systems  General: no night sweats no fever   Eyes: Vision is good.  ENT:  No sinusitis or pharyngitis.   Heart:: No chest pain or palpitations.  Lungs:increased dyspnea for two weeks, pleurisy   GI: No Nausea, vomiting, constipation, diarrhea, or reflux.  : No dysuria, hesitancy, or nocturia.  Musculoskeletal: No joint pain or myalgias.  Skin: No lesions or rashes.  Neuro: No headaches or neuropathy.  Lymph: No edema or adenopathy.  Psych: No anxiety or depression.  Endo: weight loss since being acutely ill .    OBJECTIVE:      /62 (BP Location: Right arm, Patient Position: Sitting)   Pulse 72   Temp 98.1 °F (36.7 °C)   Ht 5' 7" (1.702 m)   Wt 70.6 kg (155 lb 9.6 oz)   SpO2 95%   BMI 24.37 kg/m²     Physical Exam  GENERAL: Older patient in no distress.  HEENT: Pupils equal and reactive. Extraocular movements intact. Nose intact.      Pharynx moist.  NECK: Supple.   HEART: Regular rate and rhythm. No murmur or gallop auscultated.  LUNGS: Clear to auscultation " and percussion. Lung excursion symmetrical. No change in fremitus. No adventitial noises.  ABDOMEN: Bowel sounds present. Non-tender, no masses palpated.  EXTREMITIES: Normal muscle tone and joint movement, no cyanosis or clubbing.   LYMPHATICS: No adenopathy palpated, no edema.  SKIN: Dry, intact, no lesions.   NEURO: Cranial nerves II-XII intact. Motor strength 5/5 bilaterally, upper and lower extremities.  PSYCH: Appropriate affect.    Assessment:       1. Chronic obstructive pulmonary disease, unspecified COPD type    2. Panlobular emphysema    3. Decreased diffusion capacity              Plan:       Chronic obstructive pulmonary disease, unspecified COPD type    Panlobular emphysema    Decreased diffusion capacity    Other orders  -     umeclidinium-vilanteroL (ANORO ELLIPTA) 62.5-25 mcg/actuation DsDv; Inhale 1 puff into the lungs once daily. Controller  Dispense: 1 each; Refill: 5           Stop the Trelegy as she has been told from GI she has yeast in esophagus  Will start Anoro daily   Alpha 1 kit   Ct chest  in May    Follow up in about 6 months (around 6/17/2025).

## 2024-12-30 ENCOUNTER — TELEPHONE (OUTPATIENT)
Dept: PULMONOLOGY | Facility: CLINIC | Age: 64
End: 2024-12-30
Payer: COMMERCIAL

## 2024-12-30 NOTE — TELEPHONE ENCOUNTER
Joel notified us that patients RX Anoro is no longer under her insurance plan.  Called patient told her to call her insurance company to see if they will cover Stiolto or something else.  Told her to let us know when she finds the information to please let us know, and Lesly will send a new RX of medication to pharmacy.  She verbalized an understanding.

## 2025-01-28 ENCOUNTER — TELEPHONE (OUTPATIENT)
Dept: ENDOCRINOLOGY | Facility: CLINIC | Age: 65
End: 2025-01-28
Payer: COMMERCIAL

## 2025-01-28 NOTE — TELEPHONE ENCOUNTER
S/wpt. Advised we rec'd records from Dr. Shaw (at [facility]) & also noted in appt note. Verbalized understanding.

## 2025-01-28 NOTE — TELEPHONE ENCOUNTER
----- Message from Kiran sent at 1/28/2025 12:47 PM CST -----  Contact: self  Type: Needs Medical Advice  Who Called:  PT    Best Call Back Number: [phone]   Additional Information: Would like to see if records from previous Dr. Shaw was received.  Please call to advise.

## 2025-02-11 ENCOUNTER — TELEPHONE (OUTPATIENT)
Dept: ENDOCRINOLOGY | Facility: CLINIC | Age: 65
End: 2025-02-11
Payer: COMMERCIAL

## 2025-02-11 NOTE — TELEPHONE ENCOUNTER
----- Message from Joelle sent at 2/11/2025  2:33 PM CST -----  Regarding: Needs return call  Type: Needs Medical Advice  Who Called:  Pt    Best Call Back Number: [phone]    Additional Information: Pt was trying to find out I the office wanted her to get lab work done before coming to appt on 2/18, please jalen

## 2025-02-16 RX ORDER — UMECLIDINIUM BROMIDE AND VILANTEROL TRIFENATATE 62.5; 25 UG/1; UG/1
1 POWDER RESPIRATORY (INHALATION) DAILY
Qty: 60 EACH | Refills: 6 | Status: SHIPPED | OUTPATIENT
Start: 2025-02-16 | End: 2025-02-18

## 2025-02-18 ENCOUNTER — OFFICE VISIT (OUTPATIENT)
Dept: ENDOCRINOLOGY | Facility: CLINIC | Age: 65
End: 2025-02-18
Payer: COMMERCIAL

## 2025-02-18 VITALS
SYSTOLIC BLOOD PRESSURE: 136 MMHG | BODY MASS INDEX: 25.78 KG/M2 | DIASTOLIC BLOOD PRESSURE: 78 MMHG | HEIGHT: 67 IN | WEIGHT: 164.25 LBS | HEART RATE: 67 BPM

## 2025-02-18 DIAGNOSIS — J44.9 CHRONIC OBSTRUCTIVE PULMONARY DISEASE, UNSPECIFIED COPD TYPE: ICD-10-CM

## 2025-02-18 DIAGNOSIS — E10.319 TYPE 1 DIABETES MELLITUS WITH RETINOPATHY, MACULAR EDEMA PRESENCE UNSPECIFIED, UNSPECIFIED LATERALITY, UNSPECIFIED RETINOPATHY SEVERITY: Primary | ICD-10-CM

## 2025-02-18 DIAGNOSIS — Z46.81 INSULIN PUMP FITTING OR ADJUSTMENT: ICD-10-CM

## 2025-02-18 DIAGNOSIS — Z96.41 INSULIN PUMP STATUS: ICD-10-CM

## 2025-02-18 NOTE — PROGRESS NOTES
"Subjective     Patient ID: Clarisa Quiroz is a 64 y.o. female.    Chief Complaint: Diabetes and Insulin Pump Therapy    HPI  Pt is a 64 y.o. WF  with a diagnosis of Type 1 diabetes mellitus diagnosed approximately 1991 at age 30 years , as well as chronic conditions pending review including COPD. .  Other pertinent medical and social information noted includes, but not limited to: none  .     Interim Events: Feb 18, 2025:  Pt is new to me establishing care with the [facility] of Dr. Shaw.  Labs and notes reviewed in detail. With exception of DM, biggest health concern is COPD. She uses and insulin pump--which is downloaded and reviewed. Overall excellent control.  No focal complaints.        Current DM meds:   Medtronic 780---TDD 65 units, Lyumjev        Failed DM meds:  na        Back up plan for insulin pump hiatus: 20 basal,  CHO 1:5, ISF 1:50   Statin: atorvastatin 10 mg        Not tolerated statin : na   ACE/ARB:na        Not tolerated ACE/ARB: na   Known Diabetic complications: mild NPDR         Passwords for Tech Accounts: na         Interpretation:   Glucoses are generally hovering around 100, with exception of expected prandial glucose excursions or isolated hypoglycemia r/t carb/insulin mismatch.              Review of Systems   Constitutional:  Negative for activity change and fatigue.        Blood pressure 136/78, pulse 67, height 5' 7" (1.702 m), weight 74.5 kg (164 lb 3.9 oz).   HENT:  Negative for hearing loss and trouble swallowing.    Eyes:  Negative for photophobia and visual disturbance.        Last Eye Exam:  Cataracts 2024,  Report of  mild NPDR   Respiratory:  Positive for shortness of breath. Negative for cough.    Cardiovascular:  Negative for chest pain and palpitations.   Gastrointestinal:  Negative for constipation and diarrhea.   Genitourinary:  Negative for frequency and urgency.   Musculoskeletal:  Negative for arthralgias and myalgias.   Integumentary:  Negative for rash and wound. "   Neurological:  Negative for weakness and numbness.   Psychiatric/Behavioral:  Negative for sleep disturbance. The patient is not nervous/anxious.           Objective     Physical Exam  Constitutional:       Appearance: Normal appearance. She is normal weight.   HENT:      Head: Normocephalic and atraumatic.      Nose: Nose normal.      Mouth/Throat:      Mouth: Mucous membranes are moist.      Pharynx: Oropharynx is clear.   Eyes:      Extraocular Movements: Extraocular movements intact.      Conjunctiva/sclera: Conjunctivae normal.      Pupils: Pupils are equal, round, and reactive to light.   Neck:      Vascular: No carotid bruit.   Cardiovascular:      Rate and Rhythm: Normal rate and regular rhythm.      Pulses: Normal pulses.      Heart sounds: Normal heart sounds.   Pulmonary:      Effort: Pulmonary effort is normal.      Breath sounds: Normal breath sounds.   Abdominal:      Comments: Some scant/questionable lipohypertrophy on buttocks.  Reinforced proper site rotation    Musculoskeletal:         General: Normal range of motion.      Cervical back: Normal range of motion and neck supple.      Right lower leg: No edema.      Left lower leg: No edema.      Comments: Feet: No open wounds or calluses. Good pedal care. Pedal pulses +2 bilaterally.   Sensation via monofilament intact bilaterally    Lymphadenopathy:      Cervical: No cervical adenopathy.   Skin:     General: Skin is warm and dry.   Neurological:      General: No focal deficit present.      Mental Status: She is alert and oriented to person, place, and time.   Psychiatric:         Mood and Affect: Mood normal.         Behavior: Behavior normal.         Thought Content: Thought content normal.         Judgment: Judgment normal.            Assessment and Plan     .  1. Type 1 diabetes mellitus with retinopathy, macular edema presence unspecified, unspecified laterality, unspecified retinopathy severity  HM DIABETES FOOT EXAM    CBC Auto Differential     Celiac Disease Panel    C-Peptide    Hemoglobin A1C    Lipid Panel    Microalbumin/Creatinine Ratio, Urine    TSH    Vitamin D      2. Chronic obstructive pulmonary disease, unspecified COPD type        3. Insulin pump status        4. Insulin pump fitting or adjustment          PLAN   No changes            ORDERS 02/18/2025     4 mo with fasting labs.--cbc, cmp, celiac, -c-peptide, A1c, lipids, tsh, vit d, urine m/c

## 2025-03-12 NOTE — TELEPHONE ENCOUNTER
----- Message from Alysia sent at 3/12/2025  1:46 PM CDT -----  Type:  RX Refill RequestWho Called: pt Refill or New Rx:refill RX Name and Strength: ACCU-CHEK GUIDE TEST STRIPS StrpHow is the patient currently taking it? (ex. 1XDay):4-6  x dy Preferred Pharmacy with phone number:[id number] - [place], [place] - [address] [address] [address] AT [facility] [address] [facility] [id number] [facility] [address] [id number]: [phone] Fax: [phone] or Mail Order:local Ordering Provider:last PCP, Dr Perez is her new PCP  Would the patient rather a call back or a response via [facility]? Please call Best Call Back Number:[phone] Additional Information: pt needs test strips filled    Please call back to advise. Thanks!   Yuli Blanchard presents to Urgent Care Patient arriving with: with mother with complaint of   Pulling at right ear last night  Productive cough  Runny nose  Sick since Saturday. (3 days)    Patient and visitor wearing mask? Yes  Myself full PPE  Can leave a detailed message for Yuli Blanchard's mother Amna at [phone] for urgent care visit on 11/22/2022

## 2025-03-13 RX ORDER — BLOOD SUGAR DIAGNOSTIC
STRIP MISCELLANEOUS
Qty: 200 STRIP | Refills: 3 | Status: SHIPPED | OUTPATIENT
Start: 2025-03-13

## 2025-03-13 NOTE — TELEPHONE ENCOUNTER
----- Message from Alysia sent at 3/12/2025  1:46 PM CDT -----  Type:  RX Refill RequestWho Called: pt Refill or New Rx:refill RX Name and Strength: ACCU-CHEK GUIDE TEST STRIPS StrpHow is the patient currently taking it? (ex. 1XDay):4-6  x dy Preferred Pharmacy with phone number:[id number] - [place], [place] - [address] [address] [address] [facility] [address] [facility] [id number] [facility] [address] [facility] [address] [facility] [address]Ujujd: [phone] Fax: [phone] or Mail Order:local Ordering Provider:last PCP, Dr Perez is her new PCP  Would the patient rather a call back or a response via [facility]? Please call Best Call Back Number:[phone] Additional Information: pt needs test strips filled    Please call back to advise. Thanks!

## 2025-05-15 ENCOUNTER — TELEPHONE (OUTPATIENT)
Dept: PULMONOLOGY | Facility: CLINIC | Age: 65
End: 2025-05-15
Payer: COMMERCIAL

## 2025-05-15 DIAGNOSIS — R91.8 PULMONARY NODULES: Primary | ICD-10-CM

## 2025-05-15 NOTE — TELEPHONE ENCOUNTER
Left message informing patient the order for her follow up CT is in [facility].  [facility] scheduling will be calling you to schedule it.  If you have any questions, please call us at [phone].

## 2025-05-22 ENCOUNTER — HOSPITAL ENCOUNTER (OUTPATIENT)
Dept: RADIOLOGY | Facility: HOSPITAL | Age: 65
Discharge: HOME | End: 2025-05-22
Attending: NURSE PRACTITIONER
Payer: COMMERCIAL

## 2025-05-22 ENCOUNTER — OFFICE VISIT (OUTPATIENT)
Dept: PULMONOLOGY | Facility: CLINIC | Age: 65
End: 2025-05-22
Payer: COMMERCIAL

## 2025-05-22 VITALS
BODY MASS INDEX: 26 KG/M2 | WEIGHT: 166 LBS | SYSTOLIC BLOOD PRESSURE: 140 MMHG | HEART RATE: 83 BPM | OXYGEN SATURATION: 97 % | DIASTOLIC BLOOD PRESSURE: 70 MMHG

## 2025-05-22 DIAGNOSIS — J44.9 CHRONIC OBSTRUCTIVE PULMONARY DISEASE, UNSPECIFIED COPD TYPE: Primary | ICD-10-CM

## 2025-05-22 DIAGNOSIS — R91.8 PULMONARY NODULES: ICD-10-CM

## 2025-05-22 PROCEDURE — 3078F DIAST BP <80 MM HG: CPT | Mod: CPTII,S$GLB,, | Performed by: NURSE PRACTITIONER

## 2025-05-22 PROCEDURE — 3077F SYST BP >= 140 MM HG: CPT | Mod: CPTII,S$GLB,, | Performed by: NURSE PRACTITIONER

## 2025-05-22 PROCEDURE — 1159F MED LIST DOCD IN RCRD: CPT | Mod: CPTII,S$GLB,, | Performed by: NURSE PRACTITIONER

## 2025-05-22 PROCEDURE — 3008F BODY MASS INDEX DOCD: CPT | Mod: CPTII,S$GLB,, | Performed by: NURSE PRACTITIONER

## 2025-05-22 PROCEDURE — 71250 CT THORAX DX C-: CPT | Mod: 26,,, | Performed by: RADIOLOGY

## 2025-05-22 PROCEDURE — 71250 CT THORAX DX C-: CPT | Mod: TC,PO

## 2025-05-22 PROCEDURE — 99999 PR PBB SHADOW E&M-EST. PATIENT-LVL III: CPT | Mod: PBBFAC,,, | Performed by: NURSE PRACTITIONER

## 2025-05-22 PROCEDURE — 99214 OFFICE O/P EST MOD 30 MIN: CPT | Mod: S$GLB,,, | Performed by: NURSE PRACTITIONER

## 2025-05-22 RX ORDER — CETIRIZINE HYDROCHLORIDE 10 MG/1
10 TABLET ORAL NIGHTLY
COMMUNITY
Start: 2025-03-10

## 2025-05-22 RX ORDER — TIOTROPIUM BROMIDE AND OLODATEROL 3.124; 2.736 UG/1; UG/1
2 SPRAY, METERED RESPIRATORY (INHALATION) DAILY
Qty: 4 G | Refills: 6 | Status: SHIPPED | OUTPATIENT
Start: 2025-05-22

## 2025-05-22 NOTE — PROGRESS NOTES
SUBJECTIVE:    Patient ID: Clarisa Quiroz is a 64 y.o. female.    Chief Complaint: Follow-up      Patient here today feeling well. She is using Anoro daily. She does feel some more shortness of breath since changing to Anoro from Trelegy.  She uses her Albuterol as needed.  Her CT scan of chest showed stable nodules and a new RLL subpleural sub solid nodularity, and a new 4mm RUL. She also has mucous plugging. She is not expectorating mucous, denies fever, no night sweats.  She will let me know if she starts to cough up mucous to culture, plan to repeat CT in 3 months.   Past Medical History:   Diagnosis Date    COPD (chronic obstructive pulmonary disease)     DM (diabetes mellitus)     Dyslipidemia      History reviewed. No pertinent surgical history.  Family History   Problem Relation Name Age of Onset    Aortic aneurysm Mother      Cancer Father          Social History:   Marital Status:   Occupation: Data Unavailable  Alcohol History:  reports that she does not currently use alcohol.  Tobacco History:  reports that she quit smoking about 9 years ago. Her smoking use included cigarettes. She started smoking about 45 years ago. She has a 72 pack-year smoking history. She has never used smokeless tobacco.  Drug History:  reports no history of drug use.    Review of patient's allergies indicates:  No Known Allergies    Current Outpatient Medications   Medication Sig Dispense Refill    ACCU-CHEK FASTCLIX LANCET DRUM [facility]       ACCU-CHEK GUIDE TEST STRIPS Strp Checks every 4-6 to times a day 200 strip 3    atorvastatin (LIPITOR) 10 MG tablet 1 tablet.      cetirizine (ZYRTEC) 10 MG tablet Take 10 mg by mouth every evening.      LYUMJEV U-100 INSULIN 100 unit/mL Inject into the skin.      albuterol (VENTOLIN HFA) 90 mcg/actuation inhaler Inhale 2 puffs into the lungs every 6 (six) hours as needed for Wheezing. Rescue 18 g 6    tiotropium-olodateroL (STIOLTO RESPIMAT) 2.5-2.5 mcg/actuation Mist Inhale 2 puffs  into the lungs once daily. Controller 4 g 6     No current facility-administered medications for this visit.         Last PFT: moderate obstruction, no restriction, severe diffusion defect.   Last CT:05/2025  Impression:     1. New 4 mm pleural base nodule in right upper lobe.  2. Additional subpleural sub solid nodularity in basilar right lower lobe measuring 8 mm is new.  As a precaution, CT thorax without IV contrast follow-up is recommended in 3 months.  3. Other unchanged pulmonary nodules as described.  4. Scattered peripheral bronchial occlusions, unchanged, suggesting mucous plugging.  5. Unchanged moderate to severe emphysema.  6. Coronary artery calcifications.        Review of Systems  General: feeling well, no fever no night sweats   Eyes: Vision is good.  ENT:  No sinusitis or pharyngitis.   Heart:: No chest pain or palpitations.  Lungs no pleurisy, no cough, little more short of breath since being off the Trelegy.  GI: No Nausea, vomiting, constipation, diarrhea, or reflux.  : No dysuria, hesitancy, or nocturia.  Musculoskeletal: No joint pain or myalgias.  Skin: No lesions or rashes.  Neuro: No headaches or neuropathy.  Lymph: No edema or adenopathy.  Psych: No anxiety or depression.  Endo: weight up 10 pounds since last visit.     OBJECTIVE:      BP (!) 140/70 (BP Location: Right arm, Patient Position: Sitting)   Pulse 83   Wt 75.3 kg (166 lb)   SpO2 97%   BMI 26.00 kg/m²     Physical Exam  GENERAL: Older patient in no distress.  HEENT: Pupils equal and reactive. Extraocular movements intact. Nose intact.      Pharynx moist.  NECK: Supple.   HEART: Regular rate and rhythm. No murmur or gallop auscultated.  LUNGS: Clear to auscultation and percussion. Lung excursion symmetrical. No change in fremitus. No adventitial noises.  ABDOMEN: Bowel sounds present. Non-tender, no masses palpated.  EXTREMITIES: Normal muscle tone and joint movement, no cyanosis or clubbing.   LYMPHATICS: No adenopathy  palpated, no edema.  SKIN: Dry, intact, no lesions.   NEURO: Cranial nerves II-XII intact. Motor strength 5/5 bilaterally, upper and lower extremities.  PSYCH: Appropriate affect.    Assessment:       1. Chronic obstructive pulmonary disease, unspecified COPD type    2. Pulmonary nodules                Plan:       Chronic obstructive pulmonary disease, unspecified COPD type    Pulmonary nodules    Other orders  -     tiotropium-olodateroL (STIOLTO RESPIMAT) 2.5-2.5 mcg/actuation Mist; Inhale 2 puffs into the lungs once daily. Controller  Dispense: 4 g; Refill: 6        Switch from Anoro to Stiolto 2 puffs once a day.    Called patient back after clinic and discussed her CT   Follow up CT in 3 months for new subpleural sub solid nodularity in basilar right lower lobe measuring 8 mm is new  If you start to cough up mucous please call me and will order 3 first morning sputum cultures.      Follow up in about 6 months (around 11/22/2025).

## 2025-06-17 ENCOUNTER — TELEPHONE (OUTPATIENT)
Dept: ENDOCRINOLOGY | Facility: CLINIC | Age: 65
End: 2025-06-17
Payer: COMMERCIAL

## 2025-06-18 ENCOUNTER — OFFICE VISIT (OUTPATIENT)
Dept: ENDOCRINOLOGY | Facility: CLINIC | Age: 65
End: 2025-06-18
Payer: COMMERCIAL

## 2025-06-18 VITALS
WEIGHT: 165.13 LBS | DIASTOLIC BLOOD PRESSURE: 76 MMHG | BODY MASS INDEX: 25.92 KG/M2 | HEIGHT: 67 IN | SYSTOLIC BLOOD PRESSURE: 124 MMHG | HEART RATE: 80 BPM

## 2025-06-18 DIAGNOSIS — Z96.41 INSULIN PUMP STATUS: ICD-10-CM

## 2025-06-18 DIAGNOSIS — E10.319 TYPE 1 DIABETES MELLITUS WITH RETINOPATHY, MACULAR EDEMA PRESENCE UNSPECIFIED, UNSPECIFIED LATERALITY, UNSPECIFIED RETINOPATHY SEVERITY: Primary | ICD-10-CM

## 2025-06-18 DIAGNOSIS — J44.9 CHRONIC OBSTRUCTIVE PULMONARY DISEASE, UNSPECIFIED COPD TYPE: ICD-10-CM

## 2025-06-18 DIAGNOSIS — Z46.81 INSULIN PUMP FITTING OR ADJUSTMENT: ICD-10-CM

## 2025-06-18 DIAGNOSIS — E78.5 DYSLIPIDEMIA: ICD-10-CM

## 2025-06-18 PROCEDURE — 99999 PR PBB SHADOW E&M-EST. PATIENT-LVL III: CPT | Mod: PBBFAC,,, | Performed by: NURSE PRACTITIONER

## 2025-06-18 NOTE — PROGRESS NOTES
"Subjective     Patient ID: Clarisa Quiroz is a 64 y.o. female.    Chief Complaint: No chief complaint on file.    HPI  Pt is a 64 y.o. WF  with a diagnosis of Type 1 diabetes mellitus diagnosed approximately 1991 at age 30 years , as well as chronic conditions pending review including COPD. .  Other pertinent medical and social information noted includes, but not limited to: none  .She lives in [place] but drives to [place] daily for work.      Interim Events: June 18, 2025:  No acute events or new focal complaints.  Was unaware of labs to be done prior visit.  PUmp and sensor downlooaded.      Current DM meds:   Medtronic 780---TDD 65 units, Lyumjev        Failed DM meds:  na        Back up plan for insulin pump hiatus: 20 basal,  CHO 1:5, ISF 1:50   Statin: atorvastatin 10 mg        Not tolerated statin : na   ACE/ARB:na        Not tolerated ACE/ARB: na   Known Diabetic complications: mild NPDR         Passwords for Tech Accounts: na         Interpretation:   Glucoses are generally hovering around 100, with exception of expected prandial glucose excursions or isolated hypoglycemia r/t carb/insulin mismatch.                        Feb 18, 2025:  Pt is new to me establishing care with the residential of Dr. Shaw.  Labs and notes reviewed in detail. With exception of DM, biggest health concern is COPD. She uses and insulin pump--which is downloaded and reviewed. Overall excellent control.  No focal complaints.        Review of Systems   Constitutional:  Negative for activity change and fatigue.        Blood pressure 136/78, pulse 67, height 5' 7" (1.702 m), weight 74.5 kg (164 lb 3.9 oz).   HENT:  Negative for hearing loss and trouble swallowing.    Eyes:  Negative for photophobia and visual disturbance.        Last Eye Exam:  Cataracts 2024,  Report of  mild NPDR   Respiratory:  Positive for shortness of breath. Negative for cough.    Cardiovascular:  Negative for chest pain and palpitations. "   Gastrointestinal:  Negative for constipation and diarrhea.   Genitourinary:  Negative for frequency and urgency.   Musculoskeletal:  Negative for arthralgias and myalgias.   Integumentary:  Negative for rash and wound.   Neurological:  Negative for weakness and numbness.   Psychiatric/Behavioral:  Negative for sleep disturbance. The patient is not nervous/anxious.           Objective     Physical Exam  Constitutional:       Appearance: Normal appearance. She is normal weight.   HENT:      Head: Normocephalic and atraumatic.      Nose: Nose normal.      Mouth/Throat:      Mouth: Mucous membranes are moist.      Pharynx: Oropharynx is clear.   Eyes:      Extraocular Movements: Extraocular movements intact.      Conjunctiva/sclera: Conjunctivae normal.      Pupils: Pupils are equal, round, and reactive to light.   Neck:      Vascular: No carotid bruit.   Cardiovascular:      Rate and Rhythm: Normal rate and regular rhythm.      Pulses: Normal pulses.      Heart sounds: Normal heart sounds.   Pulmonary:      Effort: Pulmonary effort is normal.      Breath sounds: Normal breath sounds.   Abdominal:      Comments: Some scant/questionable lipohypertrophy on buttocks.  Reinforced proper site rotation    Musculoskeletal:         General: Normal range of motion.      Cervical back: Normal range of motion and neck supple.      Right lower leg: No edema.      Left lower leg: No edema.      Comments: Feet: No open wounds or calluses. Good pedal care. Pedal pulses +2 bilaterally.   Sensation via monofilament intact bilaterally    Lymphadenopathy:      Cervical: No cervical adenopathy.   Skin:     General: Skin is warm and dry.   Neurological:      General: No focal deficit present.      Mental Status: She is alert and oriented to person, place, and time.   Psychiatric:         Mood and Affect: Mood normal.         Behavior: Behavior normal.         Thought Content: Thought content normal.         Judgment: Judgment normal.  "        /76   Pulse 80   Ht 5' 7" (1.702 m)   Wt 74.9 kg (165 lb 2 oz)   BMI 25.86 kg/m²     No results found for: "HGBA1C"    Chemistry    No results found for: "NA", "K", "CL", "CO2", "BUN", "CREATININE", "GLU" No results found for: "CALCIUM", "ALKPHOS", "AST", "ALT", "BILITOT", "ESTGFRAFRICA", "EGFRNONAA"       No results found for: "CHOL"  No results found for: "HDL"  No results found for: "LDLCALC"  No results found for: "TRIG"  No results found for: "CHOLHDL"  No results found for: "MICALBCREAT"  No results found for: "TSH"  No results found for: "[id number]"       Assessment and Plan   1. Type 1 diabetes mellitus with retinopathy, macular edema presence unspecified, unspecified laterality, unspecified retinopathy severity        2. Chronic obstructive pulmonary disease, unspecified COPD type        3. Insulin pump status        4. Insulin pump fitting or adjustment        5. Dyslipidemia              PLAN   No changes   Do note infrequent site changes--but may be extended wear,   Do note some prolonged suspensions.    Long conversation of medicare coverage with pumps and sensors.             ORDERS 06/18/2025     Soon  fasting labs.--cbc, cmp, celiac, -c-peptide, A1c, lipids, tsh, vit d, urine m/c      4 mo with me--no lab     >45 min     "

## 2025-06-20 ENCOUNTER — LAB VISIT (OUTPATIENT)
Dept: LAB | Facility: HOSPITAL | Age: 65
End: 2025-06-20
Payer: COMMERCIAL

## 2025-06-20 DIAGNOSIS — E10.319 TYPE 1 DIABETES MELLITUS WITH RETINOPATHY, MACULAR EDEMA PRESENCE UNSPECIFIED, UNSPECIFIED LATERALITY, UNSPECIFIED RETINOPATHY SEVERITY: ICD-10-CM

## 2025-06-20 LAB
25(OH)D3+25(OH)D2 SERPL-MCNC: 39 NG/ML (ref 30–96)
ABSOLUTE EOSINOPHIL (OHS): 0.27 K/UL
ABSOLUTE MONOCYTE (OHS): 0.56 K/UL (ref 0.3–1)
ABSOLUTE NEUTROPHIL COUNT (OHS): 4.87 K/UL (ref 1.8–7.7)
BASOPHILS # BLD AUTO: 0.06 K/UL
BASOPHILS NFR BLD AUTO: 0.8 %
C PEPTIDE SERPL-MCNC: <0.08 NG/ML (ref 0.78–5.19)
CHOLEST SERPL-MCNC: 141 MG/DL (ref 120–199)
CHOLEST/HDLC SERPL: 2 {RATIO} (ref 2–5)
EAG (OHS): 137 MG/DL (ref 68–131)
ERYTHROCYTE [DISTWIDTH] IN BLOOD BY AUTOMATED COUNT: 12.6 % (ref 11.5–14.5)
HBA1C MFR BLD: 6.4 % (ref 4–5.6)
HCT VFR BLD AUTO: 46.7 % (ref 37–48.5)
HDLC SERPL-MCNC: 71 MG/DL (ref 40–75)
HDLC SERPL: 50.4 % (ref 20–50)
HGB BLD-MCNC: 15.7 GM/DL (ref 12–16)
IMM GRANULOCYTES # BLD AUTO: 0.03 K/UL (ref 0–0.04)
IMM GRANULOCYTES NFR BLD AUTO: 0.4 % (ref 0–0.5)
LDLC SERPL CALC-MCNC: 61 MG/DL (ref 63–159)
LYMPHOCYTES # BLD AUTO: 2.04 K/UL (ref 1–4.8)
MCH RBC QN AUTO: 31.3 PG (ref 27–31)
MCHC RBC AUTO-ENTMCNC: 33.6 G/DL (ref 32–36)
MCV RBC AUTO: 93 FL (ref 82–98)
NONHDLC SERPL-MCNC: 70 MG/DL
NUCLEATED RBC (/100WBC) (OHS): 0 /100 WBC
PLATELET # BLD AUTO: 259 K/UL (ref 150–450)
PMV BLD AUTO: 10.2 FL (ref 9.2–12.9)
RBC # BLD AUTO: 5.02 M/UL (ref 4–5.4)
RELATIVE EOSINOPHIL (OHS): 3.4 %
RELATIVE LYMPHOCYTE (OHS): 26.1 % (ref 18–48)
RELATIVE MONOCYTE (OHS): 7.2 % (ref 4–15)
RELATIVE NEUTROPHIL (OHS): 62.1 % (ref 38–73)
TRIGL SERPL-MCNC: 45 MG/DL (ref 30–150)
TSH SERPL-ACNC: 1.14 UIU/ML (ref 0.4–4)
WBC # BLD AUTO: 7.83 K/UL (ref 3.9–12.7)

## 2025-06-20 PROCEDURE — 84443 ASSAY THYROID STIM HORMONE: CPT

## 2025-06-20 PROCEDURE — 84681 ASSAY OF C-PEPTIDE: CPT

## 2025-06-20 PROCEDURE — 82465 ASSAY BLD/SERUM CHOLESTEROL: CPT

## 2025-06-20 PROCEDURE — 82306 VITAMIN D 25 HYDROXY: CPT

## 2025-06-20 PROCEDURE — 36415 COLL VENOUS BLD VENIPUNCTURE: CPT | Mod: PO

## 2025-06-20 PROCEDURE — 86258 DGP ANTIBODY EACH IG CLASS: CPT

## 2025-06-20 PROCEDURE — 85025 COMPLETE CBC W/AUTO DIFF WBC: CPT

## 2025-06-20 PROCEDURE — 83036 HEMOGLOBIN GLYCOSYLATED A1C: CPT

## 2025-06-24 LAB
W GLIADIN AB IGA, DEAMIDATED: 1.2 U/ML
W GLIADIN AB IGG, DEAMIDATED: 0.9 U/ML
W IMMUNOGLOBULIN A (IGA): 144 MG/DL
W TISSUE TRANSGLUTAMINASE IGA AB: 0.5 U/ML
W TISSUE TRANSGLUTAMINASE IGG: 1 U/ML

## 2025-06-25 DIAGNOSIS — Z12.31 ENCOUNTER FOR SCREENING MAMMOGRAM FOR MALIGNANT NEOPLASM OF BREAST: Primary | ICD-10-CM

## 2025-07-02 ENCOUNTER — HOSPITAL ENCOUNTER (OUTPATIENT)
Dept: RADIOLOGY | Facility: HOSPITAL | Age: 65
Discharge: HOME | End: 2025-07-02
Attending: OBSTETRICS & GYNECOLOGY
Payer: COMMERCIAL

## 2025-07-02 DIAGNOSIS — Z12.31 ENCOUNTER FOR SCREENING MAMMOGRAM FOR MALIGNANT NEOPLASM OF BREAST: ICD-10-CM

## 2025-07-02 PROCEDURE — 77067 SCR MAMMO BI INCL CAD: CPT | Mod: 26,,, | Performed by: RADIOLOGY

## 2025-07-02 PROCEDURE — 77067 SCR MAMMO BI INCL CAD: CPT | Mod: TC,PO

## 2025-07-02 PROCEDURE — 77063 BREAST TOMOSYNTHESIS BI: CPT | Mod: 26,,, | Performed by: RADIOLOGY

## 2025-07-29 ENCOUNTER — TELEPHONE (OUTPATIENT)
Dept: ENDOCRINOLOGY | Facility: CLINIC | Age: 65
End: 2025-07-29
Payer: COMMERCIAL

## 2025-07-29 NOTE — TELEPHONE ENCOUNTER
S/w pt and advised her [employer] form for her sensors is on Kim Perez's desk for signature and will be faxed back today. Verbalized understanding.

## 2025-07-29 NOTE — TELEPHONE ENCOUNTER
Copied from CRM #[id number]. Topic: Medications - Medication Status Check   >> Jul 29, 2025 10:49 AM Lisa wrote:  Mary from [employer] calling to check status glucose monitor that was sentover on 07/28 please call to advise  [phone] ext [phone]

## 2025-07-29 NOTE — TELEPHONE ENCOUNTER
Copied from CRM #[id number]. Topic: General Inquiry - Return Call  >> Jul 28, 2025  4:06 PM Chandler wrote:  Type:  Patient Returning Call    Who Called:patient  Who Left Message for Patient:nurse  Does the patient know what this is regarding?:medication question  Would the patient rather a call back or a response via [facility]?   Best Call Back Number:[phone]  Additional Information:

## 2025-07-30 ENCOUNTER — TELEPHONE (OUTPATIENT)
Dept: ENDOCRINOLOGY | Facility: CLINIC | Age: 65
End: 2025-07-30
Payer: COMMERCIAL

## 2025-07-30 NOTE — TELEPHONE ENCOUNTER
Copied from CRM #[id number]. Topic: Medications - Medication Refill  >> Jul 30, 2025  1:11 PM Madeline wrote:  Type:  RX Diabetic Supplies    Who Called: Nuno mcallister/. Medtronic      RX Name and Strength:Guardian 4 Sensor    How is the patient currently taking it? (ex. 1XDay):as directed    Is this a 30 day or 90 day RX:90     Preferred Pharmacy with phone number:  [facility]   Phone [phone] ext [phone]  Fax [phone]    Local or Mail Order:mail    Ordering Provider:Dr Deepa Perez    Would the patient rather a call back or a response via [facility]?  Pls fax the Rx request    Additional Information: pls let them know the Rx Request was received or fill out the form and fax back pls..

## 2025-08-21 ENCOUNTER — TELEPHONE (OUTPATIENT)
Dept: PULMONOLOGY | Facility: CLINIC | Age: 65
End: 2025-08-21
Payer: COMMERCIAL

## 2025-08-21 DIAGNOSIS — R91.8 PULMONARY NODULES: Primary | ICD-10-CM

## 2025-08-27 ENCOUNTER — HOSPITAL ENCOUNTER (OUTPATIENT)
Dept: RADIOLOGY | Facility: HOSPITAL | Age: 65
Discharge: HOME | End: 2025-08-27
Attending: NURSE PRACTITIONER
Payer: COMMERCIAL

## 2025-08-27 DIAGNOSIS — R91.8 PULMONARY NODULES: ICD-10-CM

## 2025-08-27 PROCEDURE — 71250 CT THORAX DX C-: CPT | Mod: TC,PO

## 2025-08-27 PROCEDURE — 71250 CT THORAX DX C-: CPT | Mod: 26,,, | Performed by: RADIOLOGY

## 2025-08-29 ENCOUNTER — TELEPHONE (OUTPATIENT)
Dept: PULMONOLOGY | Facility: CLINIC | Age: 65
End: 2025-08-29
Payer: COMMERCIAL